# Patient Record
Sex: FEMALE | Race: WHITE | NOT HISPANIC OR LATINO | Employment: UNEMPLOYED | URBAN - METROPOLITAN AREA
[De-identification: names, ages, dates, MRNs, and addresses within clinical notes are randomized per-mention and may not be internally consistent; named-entity substitution may affect disease eponyms.]

---

## 2023-05-05 RX ORDER — FLUOXETINE HYDROCHLORIDE 20 MG/1
20 CAPSULE ORAL DAILY
COMMUNITY
End: 2024-03-27

## 2023-05-05 RX ORDER — OMEPRAZOLE 20 MG/1
20 CAPSULE, DELAYED RELEASE ORAL DAILY
COMMUNITY

## 2023-05-05 RX ORDER — LAMOTRIGINE 200 MG/1
200 TABLET ORAL DAILY
COMMUNITY

## 2023-05-08 ENCOUNTER — ANESTHESIA (OUTPATIENT)
Dept: ENDOSCOPY | Facility: HOSPITAL | Age: 43
End: 2023-05-08
Payer: MEDICAID

## 2023-05-08 ENCOUNTER — ANESTHESIA EVENT (OUTPATIENT)
Dept: ENDOSCOPY | Facility: HOSPITAL | Age: 43
End: 2023-05-08
Payer: MEDICAID

## 2023-05-08 ENCOUNTER — HOSPITAL ENCOUNTER (OUTPATIENT)
Facility: HOSPITAL | Age: 43
Discharge: HOME | End: 2023-05-08
Attending: SURGERY | Admitting: SURGERY
Payer: MEDICAID

## 2023-05-08 VITALS
TEMPERATURE: 97 F | OXYGEN SATURATION: 97 % | SYSTOLIC BLOOD PRESSURE: 106 MMHG | DIASTOLIC BLOOD PRESSURE: 66 MMHG | RESPIRATION RATE: 16 BRPM | HEART RATE: 65 BPM

## 2023-05-08 DIAGNOSIS — K29.00 ACUTE SUPERFICIAL GASTRITIS WITHOUT HEMORRHAGE: Primary | ICD-10-CM

## 2023-05-08 DIAGNOSIS — K20.90 ESOPHAGITIS: ICD-10-CM

## 2023-05-08 LAB — B-HCG UR QL: NEGATIVE

## 2023-05-08 PROCEDURE — 43239 EGD BIOPSY SINGLE/MULTIPLE: CPT | Performed by: SURGERY

## 2023-05-08 PROCEDURE — 88305 TISSUE EXAM BY PATHOLOGIST: ICD-10-PCS | Mod: 26,,, | Performed by: PATHOLOGY

## 2023-05-08 PROCEDURE — 00731 ANES UPR GI NDSC PX NOS: CPT | Mod: QZ | Performed by: NURSE ANESTHETIST, CERTIFIED REGISTERED

## 2023-05-08 PROCEDURE — 81025 URINE PREGNANCY TEST: CPT | Performed by: SURGERY

## 2023-05-08 PROCEDURE — 88305 TISSUE EXAM BY PATHOLOGIST: CPT | Performed by: PATHOLOGY

## 2023-05-08 PROCEDURE — 27201012 HC FORCEPS, HOT/COLD, DISP: Performed by: SURGERY

## 2023-05-08 PROCEDURE — 25000003 PHARM REV CODE 250: Performed by: NURSE ANESTHETIST, CERTIFIED REGISTERED

## 2023-05-08 PROCEDURE — D9220AH HC ANESTHESIA PROFESSIONAL FEE: Mod: QZ | Performed by: NURSE ANESTHETIST, CERTIFIED REGISTERED

## 2023-05-08 PROCEDURE — 37000008 HC ANESTHESIA 1ST 15 MINUTES: Performed by: SURGERY

## 2023-05-08 PROCEDURE — 00731 ANES UPR GI NDSC PX NOS: CPT | Performed by: SURGERY

## 2023-05-08 PROCEDURE — 88305 TISSUE EXAM BY PATHOLOGIST: CPT | Mod: 26,,, | Performed by: PATHOLOGY

## 2023-05-08 PROCEDURE — 63600175 PHARM REV CODE 636 W HCPCS: Performed by: NURSE ANESTHETIST, CERTIFIED REGISTERED

## 2023-05-08 RX ORDER — PROPOFOL 10 MG/ML
VIAL (ML) INTRAVENOUS
Status: DISCONTINUED | OUTPATIENT
Start: 2023-05-08 | End: 2023-05-08

## 2023-05-08 RX ORDER — LIDOCAINE HYDROCHLORIDE 20 MG/ML
INJECTION, SOLUTION EPIDURAL; INFILTRATION; INTRACAUDAL; PERINEURAL
Status: DISCONTINUED | OUTPATIENT
Start: 2023-05-08 | End: 2023-05-08

## 2023-05-08 RX ADMIN — LIDOCAINE HYDROCHLORIDE 60 MG: 20 INJECTION, SOLUTION EPIDURAL; INFILTRATION; INTRACAUDAL; PERINEURAL at 07:05

## 2023-05-08 RX ADMIN — PROPOFOL 150 MG: 10 INJECTION, EMULSION INTRAVENOUS at 07:05

## 2023-05-08 NOTE — ANESTHESIA PREPROCEDURE EVALUATION
05/08/2023  Hubert Valencia is a 42 y.o., female.      Pre-op Assessment    I have reviewed the Patient Summary Reports.     I have reviewed the Nursing Notes.    I have reviewed the Medications.     Review of Systems  Anesthesia Hx:  No problems with previous Anesthesia    Social:  Non-Smoker, No Alcohol Use    Hematology/Oncology:  Hematology Normal   Oncology Normal     EENT/Dental:EENT/Dental Normal   Cardiovascular:  Cardiovascular Normal Exercise tolerance: good     Pulmonary:  Pulmonary Normal    Renal/:  Renal/ Normal     Hepatic/GI:   GERD, poorly controlled    Musculoskeletal:  Musculoskeletal Normal    Neurological:  Neurology Normal    Endocrine:  Endocrine Normal    Dermatological:  Skin Normal    Psych:  Psychiatric Normal           Physical Exam  General: Well nourished    Airway:  Mallampati: II / II  Mouth Opening: Normal        Anesthesia Plan  Type of Anesthesia, risks & benefits discussed:    Anesthesia Type: Gen Natural Airway  Intra-op Monitoring Plan: Standard ASA Monitors  Post Op Pain Control Plan: multimodal analgesia  Induction:  IV  Airway Plan: Direct, Post-Induction  Informed Consent: Informed consent signed with the Patient and all parties understand the risks and agree with anesthesia plan.  All questions answered. Patient consented to blood products? Yes  ASA Score: 2  Day of Surgery Review of History & Physical: H&P Update referred to the surgeon/provider.I have interviewed and examined the patient. I have reviewed the patient's H&P dated: 5/8/23. There are no significant changes.     Ready For Surgery From Anesthesia Perspective.     .

## 2023-05-08 NOTE — DISCHARGE SUMMARY
[facility]  General Surgery  Discharge Summary      Patient Name: Hubert Valencia  MRN: [id number]  Admission Date: 5/8/2023  Hospital Length of Stay: 0 days  Discharge Date and Time:  05/08/2023 7:59 AM  Attending Physician: Sami Tapia MD   Discharging Provider: Sami Tapia MD  Primary Care Provider: Sabina Ceja MD    HPI:   No notes on file    Procedure(s) (LRB):  EGD (ESOPHAGOGASTRODUODENOSCOPY) (N/A)      Indwelling Lines/Drains at time of discharge:   Lines/Drains/Airways     None               Hospital Course: 5/8/2023: Patient has some distal esophagitis and gastritis. Will continue therapy and follow up EGD in 8 weeks.       Goals of Care Treatment Preferences:             Pending Diagnostic Studies:     Procedure Component Value Units Date/Time    Specimen to Pathology, Surgery Other [[id number]] Collected: 05/08/23 0746    Order Status: Sent Lab Status: In process Updated: 05/08/23 0746        Final Active Diagnoses:    Diagnosis Date Noted POA    PRINCIPAL PROBLEM:  Acute superficial gastritis without hemorrhage [K29.00] 05/08/2023 Yes      Problems Resolved During this Admission:      Discharged Condition: good    Disposition: Home or Self Care    Follow Up:   Follow-up Information     Sami Tapia MD Follow up in 4 week(s).    Specialties: General Surgery, Wound Care  Why: Re-check  Contact information:  [address]  [facility] [address] [phone]                       Patient Instructions:      Diet general     Medications:  Reconciled Home Medications:      Medication List      CONTINUE taking these medications    FLUoxetine 20 MG capsule  Take 20 mg by mouth once daily.     lamoTRIgine 100 MG tablet  Commonly known as: LAMICTAL  Take 100 mg by mouth once daily.     omeprazole 20 MG capsule  Commonly known as: PRILOSEC  Take 20 mg by mouth once daily.          Time spent on the discharge of patient: 20 minutes    Sami Tapia MD  General Surgery  [facility]  Endoscopy

## 2023-05-08 NOTE — TRANSFER OF CARE
Anesthesia Transfer of Care Note    Patient: Hubert Valencia    Procedure(s) Performed: Procedure(s) (LRB):  EGD (ESOPHAGOGASTRODUODENOSCOPY) (N/A)    Patient location: PACU    Anesthesia Type: general    Transport from OR: Transported from OR on 6-10 L/min O2 by face mask with adequate spontaneous ventilation    Post pain: adequate analgesia    Post assessment: no apparent anesthetic complications and tolerated procedure well    Post vital signs: stable    Level of consciousness: sedated    Nausea/Vomiting: no nausea/vomiting    Complications: none    Transfer of care protocol was followed      Last vitals:   Visit Vitals  /64   Pulse 76   Temp 36.2 °C (97.1 °F) (Skin)   Resp 18   SpO2 97%   Breastfeeding No

## 2023-05-08 NOTE — ANESTHESIA POSTPROCEDURE EVALUATION
Anesthesia Post Evaluation    Patient: Hubert Valencia    Procedure(s) Performed: Procedure(s) (LRB):  EGD (ESOPHAGOGASTRODUODENOSCOPY) (N/A)    Final Anesthesia Type: general      Patient location during evaluation: PACU  Patient participation: Yes- Able to Participate  Level of consciousness: awake and alert, oriented and awake  Post-procedure vital signs: reviewed and stable  Pain management: adequate  Airway patency: patent    PONV status at discharge: No PONV  Anesthetic complications: no      Cardiovascular status: blood pressure returned to baseline  Respiratory status: unassisted, spontaneous ventilation and room air  Hydration status: euvolemic  Follow-up not needed.  Comments: [facility]          Vitals Value Taken Time   /64 05/08/23 0749   Temp  05/08/23 0750   Pulse 76 05/08/23 0749   Resp 18 05/08/23 0749   SpO2 97 % 05/08/23 0749         No case tracking events are documented in the log.      Pain/Jennyfer Score: No data recorded

## 2023-05-08 NOTE — HOSPITAL COURSE
5/8/2023: Patient has some distal esophagitis and gastritis. Will continue therapy and follow up EGD in 8 weeks.

## 2023-05-09 NOTE — BRIEF OP NOTE
DATE OF PROCEDURE: 5/8/2023     PREOPERATIVE DIAGNOSES:   Acute superficial gastritis without hemorrhage [K29.00]    POSTOPERATIVE DIAGNOSES:   Post-Op Diagnosis Codes:     * Acute superficial gastritis without hemorrhage [K29.00]    PROCEDURES PERFORMED:   Procedure(s) (LRB):  EGD (ESOPHAGOGASTRODUODENOSCOPY) (N/A)    Surgeon(s) and Role:     * Sami Tapia MD - Primary    ANESTHESIA: mac    ESTIMATED BLOOD LOSS: NONE    SPECIMEN:   Specimen (24h ago, onward)      None            CLINICAL HISTORY AND INDICATION FOR SURGERY: Hubert Valencia is a 42 y.o. female  with reflux and possible esophagitis.     PROCEDURE IN DETAIL: The patient was explained the risks and benefits of the procedure. They agreed to the procedure. They were taken to the operating room on 5/8/2023. Patient was explained the risks and benefits of the procedure and she was given IV sedation.  The scope was then advanced down from the mouth into the duodenum.  The duodenum was normal in appearance.  The patient had a biopsy taken of her antrum which was mildly inflamed, this was done for H pylori.  The scope was retroflexed.  There was no evidence of any other ulcers of the stomach.  There was mild gastritis.  Scope was then withdrawn into the esophagus, there was a small hiatal hernia.  The end of the esophagus had linear inflammation in the form of grade a esophagitis.  There was no evidence of stricture, no evidence of any other esophageal pathology.  Scope was fully withdrawn, patient tolerated the procedure well. This dictation was done with dictating software and there maybe areas contained within it.

## 2023-05-09 NOTE — BRIEF OP NOTE
[facility] - Endoscopy  Brief Operative Note    Surgery Date: 5/8/2023     Surgeon(s) and Role:     * Sami Tapia MD - Primary    Assisting Surgeon: None    Pre-op Diagnosis:  Acute superficial gastritis without hemorrhage [K29.00]    Post-op Diagnosis:  Post-Op Diagnosis Codes:     * Acute superficial gastritis without hemorrhage [K29.00]    Procedure(s) (LRB):  EGD (ESOPHAGOGASTRODUODENOSCOPY) (N/A)    Anesthesia: General    Operative Findings:  Patient was explained the risks and benefits of the procedure and she was given IV sedation.  The scope was then advanced down from the mouth into the duodenum.  The duodenum was normal in appearance.  The patient had a biopsy taken of her antrum which was mildly inflamed, this was done for H pylori.  The scope was retroflexed.  There was no evidence of any other ulcers of the stomach.  There was mild gastritis.  Scope was then withdrawn into the esophagus, there was a small hiatal hernia.  The end of the esophagus had linear inflammation in the form of grade a esophagitis.  There was no evidence of stricture, no evidence of any other esophageal pathology.  Scope was fully withdrawn, patient tolerated the procedure well.    Estimated Blood Loss: * No values recorded between 5/8/2023  7:25 AM and 5/8/2023  7:46 AM *         Specimens:   Specimen (24h ago, onward)      None              Discharge Note    OUTCOME: Patient tolerated treatment/procedure well without complication and is now ready for discharge.    DISPOSITION: Home or Self Care    FINAL DIAGNOSIS:  Acute superficial gastritis without hemorrhage    FOLLOWUP: In clinic    DISCHARGE INSTRUCTIONS:    Discharge Procedure Orders   Diet general

## 2023-05-10 LAB
FINAL PATHOLOGIC DIAGNOSIS: NORMAL
GROSS: NORMAL
Lab: NORMAL

## 2023-06-16 ENCOUNTER — HOSPITAL ENCOUNTER (EMERGENCY)
Facility: HOSPITAL | Age: 43
Discharge: HOME | End: 2023-06-16
Attending: STUDENT IN AN ORGANIZED HEALTH CARE EDUCATION/TRAINING PROGRAM
Payer: MEDICAID

## 2023-06-16 VITALS
HEART RATE: 72 BPM | BODY MASS INDEX: 34.62 KG/M2 | OXYGEN SATURATION: 96 % | HEIGHT: 64 IN | RESPIRATION RATE: 12 BRPM | SYSTOLIC BLOOD PRESSURE: 111 MMHG | DIASTOLIC BLOOD PRESSURE: 65 MMHG | WEIGHT: 202.81 LBS | TEMPERATURE: 99 F

## 2023-06-16 DIAGNOSIS — K92.1 BLACK STOOLS: Primary | ICD-10-CM

## 2023-06-16 LAB
ALBUMIN SERPL BCP-MCNC: 3.7 G/DL (ref 3.5–5.2)
ALP SERPL-CCNC: 73 U/L (ref 55–135)
ALT SERPL W/O P-5'-P-CCNC: 17 U/L (ref 10–44)
ANION GAP SERPL CALC-SCNC: 10 MMOL/L (ref 8–16)
APTT PPP: 29.7 SEC (ref 21–32)
AST SERPL-CCNC: 16 U/L (ref 10–40)
BASOPHILS # BLD AUTO: 0.04 K/UL (ref 0–0.2)
BASOPHILS NFR BLD: 0.4 % (ref 0–1.9)
BILIRUB SERPL-MCNC: 0.2 MG/DL (ref 0.1–1)
BUN SERPL-MCNC: 12 MG/DL (ref 6–20)
CALCIUM SERPL-MCNC: 8.9 MG/DL (ref 8.7–10.5)
CHLORIDE SERPL-SCNC: 107 MMOL/L (ref 95–110)
CO2 SERPL-SCNC: 20 MMOL/L (ref 23–29)
CREAT SERPL-MCNC: 0.8 MG/DL (ref 0.5–1.4)
DIFFERENTIAL METHOD: NORMAL
EOSINOPHIL # BLD AUTO: 0 K/UL (ref 0–0.5)
EOSINOPHIL NFR BLD: 0 % (ref 0–8)
ERYTHROCYTE [DISTWIDTH] IN BLOOD BY AUTOMATED COUNT: 13.4 % (ref 11.5–14.5)
EST. GFR  (NO RACE VARIABLE): >60 ML/MIN/1.73 M^2
GLUCOSE SERPL-MCNC: 94 MG/DL (ref 70–110)
HCT VFR BLD AUTO: 40.2 % (ref 37–48.5)
HGB BLD-MCNC: 13.2 G/DL (ref 12–16)
IMM GRANULOCYTES # BLD AUTO: 0.02 K/UL (ref 0–0.04)
IMM GRANULOCYTES NFR BLD AUTO: 0.2 % (ref 0–0.5)
INR PPP: 1 (ref 0.8–1.2)
LIPASE SERPL-CCNC: 58 U/L (ref 4–60)
LYMPHOCYTES # BLD AUTO: 2.7 K/UL (ref 1–4.8)
LYMPHOCYTES NFR BLD: 29 % (ref 18–48)
MCH RBC QN AUTO: 29.7 PG (ref 27–31)
MCHC RBC AUTO-ENTMCNC: 32.8 G/DL (ref 32–36)
MCV RBC AUTO: 90 FL (ref 82–98)
MONOCYTES # BLD AUTO: 0.8 K/UL (ref 0.3–1)
MONOCYTES NFR BLD: 9 % (ref 4–15)
NEUTROPHILS # BLD AUTO: 5.8 K/UL (ref 1.8–7.7)
NEUTROPHILS NFR BLD: 61.4 % (ref 38–73)
NRBC BLD-RTO: 0 /100 WBC
OB PNL STL: NEGATIVE
PLATELET # BLD AUTO: 284 K/UL (ref 150–450)
PMV BLD AUTO: 9.4 FL (ref 9.2–12.9)
POTASSIUM SERPL-SCNC: 4.1 MMOL/L (ref 3.5–5.1)
PROT SERPL-MCNC: 6.9 G/DL (ref 6–8.4)
PROTHROMBIN TIME: 10.5 SEC (ref 9–12.5)
RBC # BLD AUTO: 4.45 M/UL (ref 4–5.4)
SODIUM SERPL-SCNC: 137 MMOL/L (ref 136–145)
TROPONIN I SERPL DL<=0.01 NG/ML-MCNC: <0.006 NG/ML (ref 0–0.03)
WBC # BLD AUTO: 9.38 K/UL (ref 3.9–12.7)

## 2023-06-16 PROCEDURE — 25000003 PHARM REV CODE 250: Performed by: STUDENT IN AN ORGANIZED HEALTH CARE EDUCATION/TRAINING PROGRAM

## 2023-06-16 PROCEDURE — 96365 THER/PROPH/DIAG IV INF INIT: CPT

## 2023-06-16 PROCEDURE — 96375 TX/PRO/DX INJ NEW DRUG ADDON: CPT

## 2023-06-16 PROCEDURE — 93010 EKG 12-LEAD: ICD-10-PCS | Mod: ,,, | Performed by: INTERNAL MEDICINE

## 2023-06-16 PROCEDURE — 63600175 PHARM REV CODE 636 W HCPCS: Performed by: STUDENT IN AN ORGANIZED HEALTH CARE EDUCATION/TRAINING PROGRAM

## 2023-06-16 PROCEDURE — 94760 N-INVAS EAR/PLS OXIMETRY 1: CPT

## 2023-06-16 PROCEDURE — 93005 ELECTROCARDIOGRAM TRACING: CPT

## 2023-06-16 PROCEDURE — 83690 ASSAY OF LIPASE: CPT | Performed by: STUDENT IN AN ORGANIZED HEALTH CARE EDUCATION/TRAINING PROGRAM

## 2023-06-16 PROCEDURE — 85610 PROTHROMBIN TIME: CPT | Performed by: STUDENT IN AN ORGANIZED HEALTH CARE EDUCATION/TRAINING PROGRAM

## 2023-06-16 PROCEDURE — 36415 COLL VENOUS BLD VENIPUNCTURE: CPT | Performed by: STUDENT IN AN ORGANIZED HEALTH CARE EDUCATION/TRAINING PROGRAM

## 2023-06-16 PROCEDURE — 84484 ASSAY OF TROPONIN QUANT: CPT | Performed by: STUDENT IN AN ORGANIZED HEALTH CARE EDUCATION/TRAINING PROGRAM

## 2023-06-16 PROCEDURE — 82272 OCCULT BLD FECES 1-3 TESTS: CPT | Performed by: STUDENT IN AN ORGANIZED HEALTH CARE EDUCATION/TRAINING PROGRAM

## 2023-06-16 PROCEDURE — 99284 EMERGENCY DEPT VISIT MOD MDM: CPT | Mod: 25

## 2023-06-16 PROCEDURE — C9113 INJ PANTOPRAZOLE SODIUM, VIA: HCPCS | Performed by: STUDENT IN AN ORGANIZED HEALTH CARE EDUCATION/TRAINING PROGRAM

## 2023-06-16 PROCEDURE — 85730 THROMBOPLASTIN TIME PARTIAL: CPT | Performed by: STUDENT IN AN ORGANIZED HEALTH CARE EDUCATION/TRAINING PROGRAM

## 2023-06-16 PROCEDURE — 99900035 HC TECH TIME PER 15 MIN (STAT)

## 2023-06-16 PROCEDURE — 93010 ELECTROCARDIOGRAM REPORT: CPT | Mod: ,,, | Performed by: INTERNAL MEDICINE

## 2023-06-16 PROCEDURE — 85025 COMPLETE CBC W/AUTO DIFF WBC: CPT | Performed by: STUDENT IN AN ORGANIZED HEALTH CARE EDUCATION/TRAINING PROGRAM

## 2023-06-16 PROCEDURE — 80053 COMPREHEN METABOLIC PANEL: CPT | Performed by: STUDENT IN AN ORGANIZED HEALTH CARE EDUCATION/TRAINING PROGRAM

## 2023-06-16 RX ORDER — PANTOPRAZOLE SODIUM 40 MG/10ML
40 INJECTION, POWDER, LYOPHILIZED, FOR SOLUTION INTRAVENOUS
Status: COMPLETED | OUTPATIENT
Start: 2023-06-16 | End: 2023-06-16

## 2023-06-16 RX ORDER — SODIUM CHLORIDE 0.9 % (FLUSH) 0.9 %
10 SYRINGE (ML) INJECTION
Status: DISCONTINUED | OUTPATIENT
Start: 2023-06-16 | End: 2023-06-16 | Stop reason: HOSPADM

## 2023-06-16 RX ADMIN — SODIUM CHLORIDE 8 MG/HR: 900 INJECTION INTRAVENOUS at 10:06

## 2023-06-16 RX ADMIN — PANTOPRAZOLE SODIUM 40 MG: 40 INJECTION, POWDER, FOR SOLUTION INTRAVENOUS at 10:06

## 2023-06-16 NOTE — ED PROVIDER NOTES
Encounter Date: 6/16/2023       History     Chief Complaint   Patient presents with    Melena     42-year-old female with a history of esophagitis and gastritis presents to the emergency department with her  with black stools.  She states she has been dealing with epigastric abdominal pain for the last several days.  Yesterday, she had black stools.  She did take a little bit of Pepto-Bismol several days ago.  She denies any regular NSAID use.  She is compliant with her omeprazole.  She denies any blood thinners.    Dr. Tapia performed an EGD 5/8/23 with diagnosed with superficial gastritis without hemorrhage.      Review of patient's allergies indicates:  No Known Allergies  Past Medical History:   Diagnosis Date    Anxiety disorder, unspecified     Depression      Past Surgical History:   Procedure Laterality Date    ESOPHAGOGASTRODUODENOSCOPY N/A 5/8/2023    Procedure: EGD (ESOPHAGOGASTRODUODENOSCOPY);  Surgeon: Sami Tapia MD;  Location: [facility];  Service: General;  Laterality: N/A;     History reviewed. No pertinent family history.  Social History     Tobacco Use    Smoking status: Every Day     Packs/day: 1.00     Types: Cigarettes    Smokeless tobacco: Never   Substance Use Topics    Alcohol use: Yes     Comment: occ     Review of Systems   Constitutional:  Negative for chills and fever.   HENT:  Negative for congestion, rhinorrhea and sneezing.    Eyes:  Negative for discharge and redness.   Respiratory:  Negative for cough and shortness of breath.    Cardiovascular:  Negative for chest pain and palpitations.   Gastrointestinal:  Positive for abdominal pain. Negative for diarrhea, nausea and vomiting.   Genitourinary:  Negative for dysuria, frequency, vaginal bleeding and vaginal discharge.   Musculoskeletal:  Negative for back pain and neck pain.   Skin:  Negative for rash and wound.   Neurological:  Negative for weakness, numbness and headaches.     Physical Exam     Initial Vitals [06/16/23  0908]   BP Pulse Resp Temp SpO2   130/82 80 12 98.5 °F (36.9 °C) 98 %      MAP       --         Physical Exam    Nursing note and vitals reviewed.  Constitutional: She appears well-developed. She is not diaphoretic. No distress.   HENT:   Head: Normocephalic and atraumatic.   Right Ear: External ear normal.   Left Ear: External ear normal.   Eyes: Right eye exhibits no discharge. Left eye exhibits no discharge. No scleral icterus.   Neck: Neck supple.   Cardiovascular:  Normal rate and regular rhythm.           Pulmonary/Chest: Breath sounds normal. No stridor. No respiratory distress. She has no wheezes. She has no rhonchi. She has no rales.   Abdominal: Abdomen is soft. There is abdominal tenderness in the epigastric area. There is no guarding.   Musculoskeletal:         General: No edema.      Cervical back: Neck supple.     Neurological: She is alert and oriented to person, place, and time.   Skin: Skin is warm and dry. Capillary refill takes less than 2 seconds.   Psychiatric: She has a normal mood and affect.         ED Course   Procedures  Labs Reviewed   COMPREHENSIVE METABOLIC PANEL - Abnormal; Notable for the following components:       Result Value    CO2 20 (*)     All other components within normal limits   CBC W/ AUTO DIFFERENTIAL   PROTIME-INR   OCCULT BLOOD X 1, STOOL   APTT   LIPASE   TROPONIN I     EKG Readings: (Independently Interpreted)   NSR at 82 bpm. Normal axis. Normal intervals. No STEMI. No prior ECG for comparison.     Imaging Results    None          Medications   sodium chloride 0.9% flush 10 mL (has no administration in time range)   pantoprazole injection 40 mg (40 mg Intravenous Given 6/16/23 1022)   pantoprazole (PROTONIX) 40 mg in sodium chloride 0.9 % 100 mL IVPB (MB+) (8 mg/hr Intravenous New Bag 6/16/23 1027)     Medical Decision Making:   Differential Diagnosis:   Ddx: melena, pepto-bismol stool, anemia, shock, peritonitis, colitis  ED Management:  Based on the patient's  evaluation, the patient appears well for discharge home.  She is stable.  No anemia.  Black stools but FOBT negative. Suspect due to Pepto-Bismol use.  Will discharge home to continue PPI and follow up with Dr. Tapia who is planning to repeat an EGD on the patient in a few weeks.  Patient is in agreement.                        Clinical Impression:   Final diagnoses:  [K92.1] Black stools (Primary)        ED Disposition Condition    Discharge Stable          ED Prescriptions    None       Follow-up Information       Follow up With Specialties Details Why Contact Info    Sami Tapia MD General Surgery, Wound Care Call in 5 days  [address] [address]  [phone]               Alan Smith DO  06/16/23 6474

## 2024-03-27 ENCOUNTER — OFFICE VISIT (OUTPATIENT)
Dept: NEUROLOGY | Facility: CLINIC | Age: 44
End: 2024-03-27
Payer: COMMERCIAL

## 2024-03-27 VITALS
RESPIRATION RATE: 16 BRPM | HEART RATE: 80 BPM | SYSTOLIC BLOOD PRESSURE: 122 MMHG | BODY MASS INDEX: 34.91 KG/M2 | WEIGHT: 197.06 LBS | HEIGHT: 63 IN | DIASTOLIC BLOOD PRESSURE: 86 MMHG

## 2024-03-27 DIAGNOSIS — F17.200 NICOTINE DEPENDENCE, UNCOMPLICATED, UNSPECIFIED NICOTINE PRODUCT TYPE: ICD-10-CM

## 2024-03-27 DIAGNOSIS — G25.0 ESSENTIAL TREMOR: Primary | ICD-10-CM

## 2024-03-27 DIAGNOSIS — G21.19 DRUG-INDUCED PARKINSONISM: ICD-10-CM

## 2024-03-27 PROCEDURE — 3008F BODY MASS INDEX DOCD: CPT | Mod: CPTII,S$GLB,, | Performed by: STUDENT IN AN ORGANIZED HEALTH CARE EDUCATION/TRAINING PROGRAM

## 2024-03-27 PROCEDURE — 99205 OFFICE O/P NEW HI 60 MIN: CPT | Mod: S$GLB,,, | Performed by: STUDENT IN AN ORGANIZED HEALTH CARE EDUCATION/TRAINING PROGRAM

## 2024-03-27 PROCEDURE — 3074F SYST BP LT 130 MM HG: CPT | Mod: CPTII,S$GLB,, | Performed by: STUDENT IN AN ORGANIZED HEALTH CARE EDUCATION/TRAINING PROGRAM

## 2024-03-27 PROCEDURE — G2211 COMPLEX E/M VISIT ADD ON: HCPCS | Mod: S$GLB,,, | Performed by: STUDENT IN AN ORGANIZED HEALTH CARE EDUCATION/TRAINING PROGRAM

## 2024-03-27 PROCEDURE — 3079F DIAST BP 80-89 MM HG: CPT | Mod: CPTII,S$GLB,, | Performed by: STUDENT IN AN ORGANIZED HEALTH CARE EDUCATION/TRAINING PROGRAM

## 2024-03-27 PROCEDURE — 99999 PR PBB SHADOW E&M-EST. PATIENT-LVL III: CPT | Mod: PBBFAC,,, | Performed by: STUDENT IN AN ORGANIZED HEALTH CARE EDUCATION/TRAINING PROGRAM

## 2024-03-27 PROCEDURE — 1159F MED LIST DOCD IN RCRD: CPT | Mod: CPTII,S$GLB,, | Performed by: STUDENT IN AN ORGANIZED HEALTH CARE EDUCATION/TRAINING PROGRAM

## 2024-03-27 RX ORDER — ERGOCALCIFEROL 1.25 MG/1
1 CAPSULE ORAL
COMMUNITY
Start: 2024-03-04

## 2024-03-27 RX ORDER — DULOXETIN HYDROCHLORIDE 60 MG/1
60 CAPSULE, DELAYED RELEASE ORAL DAILY
COMMUNITY
Start: 2024-03-08

## 2024-03-27 RX ORDER — PROPRANOLOL HYDROCHLORIDE 60 MG/1
60 CAPSULE, EXTENDED RELEASE ORAL DAILY
COMMUNITY
Start: 2024-01-29 | End: 2024-03-27 | Stop reason: SDUPTHER

## 2024-03-27 RX ORDER — PROPRANOLOL HYDROCHLORIDE 20 MG/1
20 TABLET ORAL 3 TIMES DAILY
Qty: 270 TABLET | Refills: 3 | Status: SHIPPED | OUTPATIENT
Start: 2024-03-27 | End: 2025-03-27

## 2024-03-27 RX ORDER — ARIPIPRAZOLE 5 MG/1
5 TABLET ORAL DAILY
COMMUNITY
Start: 2024-02-29

## 2024-03-27 RX ORDER — DULOXETIN HYDROCHLORIDE 30 MG/1
30 CAPSULE, DELAYED RELEASE ORAL DAILY
COMMUNITY
Start: 2024-03-08

## 2024-03-27 NOTE — PATIENT INSTRUCTIONS
Look into caprylic acid for essential tremor.     ESSENTIAL TREMOR    Your symptoms and examination are most consistent with Essential tremor (ET). This is also commonly referred to as benign essential tremor or familial tremor. The most common symptom is arm tremor that occurs with action. It may also affect the head, voice, and sometimes the legs. Some individuals may have associated mild imbalance and some have some mild cognitive changes. Otherwise, there are almost no other associated symptoms. Tremor progresses slowly over years. Tremor can make it more difficult to eat off utensils, drink without spilling, write legibly, apply make-up, and shave.    There are different ways to treat tremor. A conservative measure is to meet with an occupational therapist to discuss adaptive equipment. Weighted utensils (forks/spoons) and weighted cups may dampen tremor and allow one to eat without spilling. Weighted gloves and weighted pens are also available on [employer]. There is a new anti-tremor spoon called the Liftware spoon. More information and videos can be found at www.[employer]cast.    Oral medications may be helpful. There are many medications but the two first line agents are propranolol (Inderal) and primidone (Mysoline). A 30% improvement in the tremor is typically goal with oral medications.  A brain surgery is also available for ET called deep brain stimulation (DBS). DBS is reserved for patients with disabling tremor that is refractory to medication. Other features are necessary to qualify for the surgery but we will discuss these if/when necessary. Brittnee Trio is also an option however is contraindicated in epilepsy if this applies to you.     You may find more information on ET and treatments at [email or web address].

## 2024-03-27 NOTE — PROGRESS NOTES
Name: Hubert Valencia  MRN: [id number]   CSN: [id number]      Date: 03/27/2024    Referring physician:  Sabina Ceja MD  [address]  [place] [address]    Chief Complaint / Interval History: Tremor    History of Present Illness (HPI):    Ms. Valencia is a 42 yo RH woman who presents for evaluation of tremor.   Tremor started in her early 20s involving both hands. Also in the legs and head more recently. Mostly with use of her hands more so than rest.   Began treatment for bipolar with neuroleptics in her 30s.   No walking issues.   Drinks a moderate amount of caffeine. Drinks 2 cups coffee/day and 1 soft drink. Also smokes.   Drinks ETOH very rarely. Her tremors do get better with ETOH.   Mother also has ET. Her nephew and great niece also have tremor. No PD in the family.   Handwriting is affected.     Current Mvmt Medications:  Lamictal 200mg daily   Abilify 5mg daily   Duloxetine 90mg daily   Propanolol 60mg ER daily -- hasn't been on in a few weeks     Prior Mvmt Medication Trials:  Primidone should be used with caution as it lowers the effectiveness of lamictal     Nonmotor ROS:  Smell/Taste: no issues   Voice/Swallowing: no changes   Gait/Falls: no issues   Dizziness: no issues  Hydration: does well with this   Urinary Issues: none   Constipation: none   Sleep/RBD: no active dreams, sleeps well   Hallucinations/Peripheral Illusions: none   Memory/Cognition/Language: doing ok   Mood: doing ok     DA ROS:  ICD Symptoms:  EDS:  LE Swelling:    Atypical ROS:  Sexual Dysfunction:  Pseudobulbar:  Stridor/SOB:  Visual Change:    Past Medical History: The patient  has a past medical history of Anxiety disorder, unspecified and Depression.    Relevant Surgical History:   Past Surgical History:   Procedure Laterality Date    ESOPHAGOGASTRODUODENOSCOPY N/A 5/8/2023    Procedure: EGD (ESOPHAGOGASTRODUODENOSCOPY);  Surgeon: Sami Tapia MD;  Location: [facility];  Service: General;  Laterality: N/A;  "      Social History: The patient  reports that she has been smoking cigarettes. She has never used smokeless tobacco. She reports current alcohol use.  Currently smokes 1 ppd.     Family History: Their family history is not on file. Mother with ET.     Allergies: Patient has no known allergies.     Meds:   Current Outpatient Medications on File Prior to Visit   Medication Sig Dispense Refill    ARIPiprazole (ABILIFY) 5 MG Tab Take 5 mg by mouth once daily.      DULoxetine (CYMBALTA) 30 MG capsule Take 30 mg by mouth once daily. In addition to 60 mg      DULoxetine (CYMBALTA) 60 MG capsule Take 60 mg by mouth once daily.      ergocalciferol (ERGOCALCIFEROL) 50,000 unit Cap Take 1 capsule by mouth every 7 days.      lamoTRIgine (LAMICTAL) 200 MG tablet Take 200 mg by mouth once daily.      omeprazole (PRILOSEC) 20 MG capsule Take 20 mg by mouth once daily.      [DISCONTINUED] propranoloL (INDERAL LA) 60 MG 24 hr capsule Take 60 mg by mouth once daily.      [DISCONTINUED] FLUoxetine 20 MG capsule Take 20 mg by mouth once daily.       No current facility-administered medications on file prior to visit.       Exam:  /86 (BP Location: Left arm, Patient Position: Sitting, BP Method: Medium (Manual))   Pulse 80   Resp 16   Ht 5' 3" (1.6 m)   Wt 89.4 kg (197 lb 1.5 oz)   BMI 34.91 kg/m²     Constitutional  Well-developed, well-nourished, appears stated age   Cardiovascular  No LE edema bilaterally   Neurological    * Mental status  MOCA = not done during today's visit     - Orientation  Oriented to conversation     - Memory   Intact recent and remote     - Attention/concentration  Attentive, vigilant during exam     - Language  Intact to conversation.     - Fund of knowledge  Aware of current events     - Executive  Well-organized thoughts     - Other     * Cranial nerves       - CN II  Pupils equal, visual fields full to confrontation     - CN III, IV, VI  Extraocular movements full, normal pursuits and saccades "         - CN VII  Face strong and symmetric bilaterally     - CN VIII  Hearing intact bilaterally         - CN XI  SCM and trapezius 5/5 bilaterally       * Motor  Muscle bulk normal, strength 5/5 throughout   * Sensory   Intact to light touch throughout   * Coordination  No dysmetria with finger-to-nose or heel-to-shin   * Gait  See below.   * Deep tendon reflexes  2+ and symmetric throughout     * Specialized movement exam Gen: normal facial expressiosn  Speech: vocal tremor   Tremor: right hand and foot tremor at rest, bilateral postural and action tremor (L>R), no-no head tremor   Bradykinesia: some mild slowing with decrement on the right with finger taps - some mild slowing in bilateral feet with toe taps   Tone: normal   Gait: reduced arm swing bilaterally, slightly stooped, good stride length, no tremor      Medical Record Review:  Labs, imaging and prior notes reviewed independently.     Diagnoses:          1. Essential tremor        2. Drug-induced parkinsonism        3. Nicotine dependence, uncomplicated, unspecified nicotine product type            Assessment:  Ms. Valencia is a 44 yo RH woman with bipolar disorder and what appears to be mixed tremor ET>>DIP (affecting right side). She is most bothered by her ET symptoms which include action and postural tremor in her bilateral hands (L>R) and head tremor. She was previously on Propranolol 60mg ER but has been off of it for a few weeks. Didn't find it overly helpful but also was not having any side effects. She is on Abilify and Lamictal both of which can cause tremor. She has some bradykinesia involving the right arm/leg which is likely neuroleptic driven. Her tremors began BEFORE starting any neuroleptics for Bipolar. We agreed to the following:     Plan:  - For ET, I will resume Propranolol but will dose 20mg TID with plans to increase as tolerated after 2-3 weeks. Discussed common side effects. Hesitant to use primidone due to its affect on lamictal.  Can consider GBP or TPM. Discussed Octanoic Acid supplement.  - For parkinsonism involving her right deandre-body - this is most likely related to her Abilify usage. Would not recommend that she change her regimen if it is working for her. Just continue to monitor. Not currently bothersome. No tardive movements.   - We briefly discussed lesional therapies for ET given her family history.   - We discussed that LTG can also cause tremor however this is rare when not combined it other drugs such as VPA.  - Limit caffeine and nicotine usage for better tremor control.   - Discussed weighted devices. Can consider OT in the future.     The visit today is associated with current or anticipated ongoing medical care related to this patients complex condition. Patient should RTC in 4 months to see me.     Total time: 60 minutes spent on the encounter, which includes face to face time and non-face to face time preparing to see the patient (eg, review of tests), Obtaining and/or reviewing separately obtained history, Documenting clinical information in the electronic or other health record, Independently interpreting results (not separately reported) and communicating results to the patient/family/caregiver, or Care coordination (not separately reported).     Anali Moore MD  Division of Movement and Memory Disorders  [facility]  [phone]

## 2024-07-25 ENCOUNTER — OFFICE VISIT (OUTPATIENT)
Dept: NEUROLOGY | Facility: CLINIC | Age: 44
End: 2024-07-25
Payer: COMMERCIAL

## 2024-07-25 VITALS
SYSTOLIC BLOOD PRESSURE: 106 MMHG | OXYGEN SATURATION: 98 % | HEIGHT: 63 IN | BODY MASS INDEX: 34.06 KG/M2 | DIASTOLIC BLOOD PRESSURE: 74 MMHG | HEART RATE: 84 BPM | WEIGHT: 192.25 LBS

## 2024-07-25 DIAGNOSIS — G21.19 DRUG-INDUCED PARKINSONISM: ICD-10-CM

## 2024-07-25 DIAGNOSIS — F17.200 NICOTINE DEPENDENCE, UNCOMPLICATED, UNSPECIFIED NICOTINE PRODUCT TYPE: ICD-10-CM

## 2024-07-25 DIAGNOSIS — G25.0 ESSENTIAL TREMOR: Primary | ICD-10-CM

## 2024-07-25 PROCEDURE — 99999 PR PBB SHADOW E&M-EST. PATIENT-LVL III: CPT | Mod: PBBFAC,,, | Performed by: STUDENT IN AN ORGANIZED HEALTH CARE EDUCATION/TRAINING PROGRAM

## 2024-07-25 PROCEDURE — G2211 COMPLEX E/M VISIT ADD ON: HCPCS | Mod: S$GLB,,, | Performed by: STUDENT IN AN ORGANIZED HEALTH CARE EDUCATION/TRAINING PROGRAM

## 2024-07-25 PROCEDURE — 3008F BODY MASS INDEX DOCD: CPT | Mod: CPTII,S$GLB,, | Performed by: STUDENT IN AN ORGANIZED HEALTH CARE EDUCATION/TRAINING PROGRAM

## 2024-07-25 PROCEDURE — 3078F DIAST BP <80 MM HG: CPT | Mod: CPTII,S$GLB,, | Performed by: STUDENT IN AN ORGANIZED HEALTH CARE EDUCATION/TRAINING PROGRAM

## 2024-07-25 PROCEDURE — 99214 OFFICE O/P EST MOD 30 MIN: CPT | Mod: S$GLB,,, | Performed by: STUDENT IN AN ORGANIZED HEALTH CARE EDUCATION/TRAINING PROGRAM

## 2024-07-25 PROCEDURE — 3074F SYST BP LT 130 MM HG: CPT | Mod: CPTII,S$GLB,, | Performed by: STUDENT IN AN ORGANIZED HEALTH CARE EDUCATION/TRAINING PROGRAM

## 2024-07-25 PROCEDURE — 1159F MED LIST DOCD IN RCRD: CPT | Mod: CPTII,S$GLB,, | Performed by: STUDENT IN AN ORGANIZED HEALTH CARE EDUCATION/TRAINING PROGRAM

## 2024-07-25 RX ORDER — PROPRANOLOL HYDROCHLORIDE 80 MG/1
80 CAPSULE, EXTENDED RELEASE ORAL DAILY
Qty: 30 CAPSULE | Refills: 11 | Status: SHIPPED | OUTPATIENT
Start: 2024-07-25 | End: 2025-07-25

## 2024-07-25 NOTE — PROGRESS NOTES
Name: Hubert Valencia  MRN: [id number]   CSN: [id number]      Date: 07/25/2024    Referring physician:  No referring provider defined for this encounter.    Chief Complaint / Interval History: Tremor    History of Present Illness (HPI):    Ms. Valencia is a 44 yo RH woman who presents for evaluation of tremor.   Tremor started in her early 20s involving both hands. Also in the legs and head more recently. Mostly with use of her hands more so than rest.   Began treatment for bipolar with neuroleptics in her 30s.   No walking issues.   Drinks a moderate amount of caffeine. Drinks 2 cups coffee/day and 1 soft drink. Also smokes.   Drinks ETOH very rarely. Her tremors do get better with ETOH.   Mother also has ET. Her nephew and great niece also have tremor. No PD in the family.   Handwriting is affected.     Interval History:  Ms. Valencia presents for follow-up.   Currently on Propranolol 20mg TID.  Hasn't yet tried any weighted devices.   No falls. No changes in walking.   Not much coffee but smoking is more of an issue.     Current Mvmt Medications:  Lamictal 200mg daily   Abilify 5mg daily   Duloxetine 90mg daily   Propanolol 60mg ER daily -- hasn't been on in a few weeks     Prior Mvmt Medication Trials:  Primidone should be used with caution as it lowers the effectiveness of lamictal     Nonmotor ROS:  Smell/Taste: no issues   Voice/Swallowing: no changes   Gait/Falls: no issues   Dizziness: no issues  Hydration: does well with this   Urinary Issues: none   Constipation: none   Sleep/RBD: no active dreams, sleeps well   Hallucinations/Peripheral Illusions: none   Memory/Cognition/Language: doing ok   Mood: doing ok     Past Medical History: The patient  has a past medical history of Anxiety disorder, unspecified and Depression.    Relevant Surgical History:   Past Surgical History:   Procedure Laterality Date    ESOPHAGOGASTRODUODENOSCOPY N/A 5/8/2023    Procedure: EGD (ESOPHAGOGASTRODUODENOSCOPY);  Surgeon: Sami Tapia,  "MD;  Location: [facility];  Service: General;  Laterality: N/A;       Social History: The patient  reports that she has been smoking cigarettes. She has never used smokeless tobacco. She reports current alcohol use.  Currently smokes 1 ppd.     Family History: Their family history is not on file. Mother with ET.     Allergies: Patient has no known allergies.     Meds:   Current Outpatient Medications on File Prior to Visit   Medication Sig Dispense Refill    ARIPiprazole (ABILIFY) 5 MG Tab Take 5 mg by mouth once daily.      DULoxetine (CYMBALTA) 30 MG capsule Take 30 mg by mouth once daily. In addition to 60 mg      DULoxetine (CYMBALTA) 60 MG capsule Take 60 mg by mouth once daily.      ergocalciferol (ERGOCALCIFEROL) 50,000 unit Cap Take 1 capsule by mouth every 7 days.      lamoTRIgine (LAMICTAL) 200 MG tablet Take 200 mg by mouth once daily.      omeprazole (PRILOSEC) 20 MG capsule Take 20 mg by mouth once daily.      [DISCONTINUED] propranoloL (INDERAL) 20 MG tablet Take 1 tablet (20 mg total) by mouth 3 (three) times daily. 270 tablet 3     No current facility-administered medications on file prior to visit.       Exam:  /74 (BP Location: Left arm, Patient Position: Sitting, BP Method: Medium (Manual))   Pulse 84   Ht 5' 3" (1.6 m)   Wt 87.2 kg (192 lb 3.9 oz)   SpO2 98%   BMI 34.05 kg/m²     Constitutional  Well-developed, well-nourished, appears stated age   Cardiovascular  No LE edema bilaterally   Neurological    * Mental status  MOCA = not done during today's visit     - Orientation  Oriented to conversation     - Memory   Intact recent and remote     - Attention/concentration  Attentive, vigilant during exam     - Language  Intact to conversation.     - Fund of knowledge  Aware of current events     - Executive  Well-organized thoughts     - Other     * Cranial nerves       - CN II  Pupils equal, visual fields full to confrontation     - CN III, IV, VI  Extraocular movements full, normal " pursuits and saccades         - CN VII  Face strong and symmetric bilaterally     - CN VIII  Hearing intact bilaterally         - CN XI  SCM and trapezius 5/5 bilaterally       * Motor  Muscle bulk normal, strength 5/5 throughout   * Sensory   Intact to light touch throughout   * Coordination  No dysmetria with finger-to-nose or heel-to-shin   * Gait  See below.   * Deep tendon reflexes  2+ and symmetric throughout     * Specialized movement exam Gen: normal facial expressiosn  Speech: vocal tremor   Tremor: right hand and foot tremor at rest - slight left foot rest tremor as well, bilateral postural and action tremor (L>R), no-no head tremor   Bradykinesia: some mild slowing with decrement on the right with finger taps - some mild slowing in bilateral feet with toe taps   Tone: normal   Gait: reduced arm swing bilaterally, slightly stooped, good stride length, no tremor          Medical Record Review:  Labs, imaging and prior notes reviewed independently.     Diagnoses:          1. Essential tremor  propranoloL (INDERAL LA) 80 MG 24 hr capsule      2. Drug-induced parkinsonism        3. Nicotine dependence, uncomplicated, unspecified nicotine product type              Assessment:  Ms. Valencia is a 44 yo RH woman with bipolar disorder and what appears to be mixed tremor ET>>DIP (affecting right side). She is most bothered by her ET symptoms which include action and postural tremor in her bilateral hands (L>R) and head tremor. She was previously on Propranolol 60mg daily but felt it was not very effective. She reports no side effects to propranolol. She is on Abilify and Lamictal both of which can cause tremor. She has some bradykinesia involving the right arm/leg which is likely neuroleptic driven. Her tremors began BEFORE starting any neuroleptics for Bipolar. We agreed to the following:     Plan:  - For ET, I increase her to Propranolol LA 80mg daily. Discussed common side effects.Hesitant to use primidone due to  its affect on lamictal. Can consider GBP or TPM. Discussed Octanoic Acid supplement.  - For parkinsonism involving her right deandre-body - this is most likely related to her Abilify usage. Would not recommend that she change her regimen if it is working for her. Just continue to monitor. Not currently bothersome. No tardive movements.   - We briefly discussed lesional therapies for ET given her family history.   - We discussed that LTG can also cause tremor however this is rare when not combined it other drugs such as VPA.  - Limit caffeine and nicotine usage for better tremor control.   - Discussed weighted devices. Can consider OT in the future.     The visit today is associated with current or anticipated ongoing medical care related to this patients complex condition. Patient should RTC in 4 months to see me.     Anali Moore MD  Division of Movement and Memory Disorders  [facility]  [phone]

## 2024-11-27 ENCOUNTER — OFFICE VISIT (OUTPATIENT)
Dept: NEUROLOGY | Facility: CLINIC | Age: 44
End: 2024-11-27
Payer: MEDICAID

## 2024-11-27 VITALS
HEART RATE: 76 BPM | HEIGHT: 63 IN | WEIGHT: 194.25 LBS | DIASTOLIC BLOOD PRESSURE: 86 MMHG | SYSTOLIC BLOOD PRESSURE: 116 MMHG | RESPIRATION RATE: 16 BRPM | BODY MASS INDEX: 34.42 KG/M2

## 2024-11-27 DIAGNOSIS — G21.19 DRUG-INDUCED PARKINSONISM: ICD-10-CM

## 2024-11-27 DIAGNOSIS — G25.0 ESSENTIAL TREMOR: Primary | ICD-10-CM

## 2024-11-27 PROCEDURE — 99214 OFFICE O/P EST MOD 30 MIN: CPT | Mod: S$PBB,,, | Performed by: STUDENT IN AN ORGANIZED HEALTH CARE EDUCATION/TRAINING PROGRAM

## 2024-11-27 PROCEDURE — 3008F BODY MASS INDEX DOCD: CPT | Mod: CPTII,,, | Performed by: STUDENT IN AN ORGANIZED HEALTH CARE EDUCATION/TRAINING PROGRAM

## 2024-11-27 PROCEDURE — G2211 COMPLEX E/M VISIT ADD ON: HCPCS | Mod: S$PBB,,, | Performed by: STUDENT IN AN ORGANIZED HEALTH CARE EDUCATION/TRAINING PROGRAM

## 2024-11-27 PROCEDURE — 99213 OFFICE O/P EST LOW 20 MIN: CPT | Mod: PBBFAC | Performed by: STUDENT IN AN ORGANIZED HEALTH CARE EDUCATION/TRAINING PROGRAM

## 2024-11-27 PROCEDURE — 3074F SYST BP LT 130 MM HG: CPT | Mod: CPTII,,, | Performed by: STUDENT IN AN ORGANIZED HEALTH CARE EDUCATION/TRAINING PROGRAM

## 2024-11-27 PROCEDURE — 1159F MED LIST DOCD IN RCRD: CPT | Mod: CPTII,,, | Performed by: STUDENT IN AN ORGANIZED HEALTH CARE EDUCATION/TRAINING PROGRAM

## 2024-11-27 PROCEDURE — 3079F DIAST BP 80-89 MM HG: CPT | Mod: CPTII,,, | Performed by: STUDENT IN AN ORGANIZED HEALTH CARE EDUCATION/TRAINING PROGRAM

## 2024-11-27 PROCEDURE — 99999 PR PBB SHADOW E&M-EST. PATIENT-LVL III: CPT | Mod: PBBFAC,,, | Performed by: STUDENT IN AN ORGANIZED HEALTH CARE EDUCATION/TRAINING PROGRAM

## 2024-11-27 RX ORDER — ATORVASTATIN CALCIUM 20 MG/1
20 TABLET, FILM COATED ORAL NIGHTLY
COMMUNITY
Start: 2024-11-07

## 2024-11-27 NOTE — PROGRESS NOTES
Name: Hubert Valencia  MRN: [id number]   CSN: [id number]      Date: 11/27/2024    Chief Complaint / Interval History: Tremor    History of Present Illness (HPI):    Ms. Valencia is a 42 yo RH woman who presents for evaluation of tremor.   Tremor started in her early 20s involving both hands. Also in the legs and head more recently. Mostly with use of her hands more so than rest.   Began treatment for bipolar with neuroleptics in her 30s.   No walking issues.   Drinks a moderate amount of caffeine. Drinks 2 cups coffee/day and 1 soft drink. Also smokes.   Drinks ETOH very rarely. Her tremors do get better with ETOH.   Mother also has ET. Her nephew and great niece also have tremor. No PD in the family.   Handwriting is affected.     Interval History:  Ms. Valencia presents for follow-up.   She is tolerating Propranolol LA 80mg without issue. She missed a weekend and noticed worsening in her tremor. No falls. No changing in her walking. Limiting caffeine has been difficult.   Hand and head shaking have worsened slightly per .     Current Mvmt Medications:  Lamictal 200mg daily   Abilify 5mg daily   Duloxetine 90mg daily   Propanolol 80mg ER daily   Prilosec     Prior Mvmt Medication Trials:  Primidone should be used with caution as it lowers the effectiveness of lamictal     Nonmotor ROS:  Smell/Taste: no issues   Voice/Swallowing: no changes   Gait/Falls: no issues   Dizziness: no issues  Hydration: does well with this   Urinary Issues: none   Constipation: none   Sleep/RBD: no active dreams, sleeps well   Hallucinations/Peripheral Illusions: none   Memory/Cognition/Language: doing ok   Mood: doing ok     Past Medical History: The patient  has a past medical history of Anxiety disorder, unspecified and Depression.    Relevant Surgical History:   Past Surgical History:   Procedure Laterality Date    ESOPHAGOGASTRODUODENOSCOPY N/A 5/8/2023    Procedure: EGD (ESOPHAGOGASTRODUODENOSCOPY);  Surgeon: Sami Tapia MD;   "Location: [facility];  Service: General;  Laterality: N/A;       Social History: The patient  reports that she has been smoking cigarettes. She has never used smokeless tobacco. She reports current alcohol use.  Currently smokes 1 ppd.     Family History: Their family history is not on file. Mother with ET.     Allergies: Patient has no known allergies.     Meds:   Current Outpatient Medications on File Prior to Visit   Medication Sig Dispense Refill    ARIPiprazole (ABILIFY) 5 MG Tab Take 5 mg by mouth once daily.      atorvastatin (LIPITOR) 20 MG tablet Take 20 mg by mouth every evening.      DULoxetine (CYMBALTA) 30 MG capsule Take 30 mg by mouth once daily. In addition to 60 mg      DULoxetine (CYMBALTA) 60 MG capsule Take 60 mg by mouth once daily.      lamoTRIgine (LAMICTAL) 200 MG tablet Take 200 mg by mouth once daily.      omeprazole (PRILOSEC) 20 MG capsule Take 20 mg by mouth once daily.      propranoloL (INDERAL LA) 80 MG 24 hr capsule Take 1 capsule (80 mg total) by mouth once daily. 30 capsule 11    [DISCONTINUED] ergocalciferol (ERGOCALCIFEROL) 50,000 unit Cap Take 1 capsule by mouth every 7 days.       No current facility-administered medications on file prior to visit.       Exam:  /86 (BP Location: Left arm, Patient Position: Sitting)   Pulse 76   Resp 16   Ht 5' 3" (1.6 m)   Wt 88.1 kg (194 lb 3.6 oz)   BMI 34.41 kg/m²     Constitutional  Well-developed, well-nourished, appears stated age   Cardiovascular  No LE edema bilaterally   Neurological    * Mental status  MOCA = not done during today's visit     - Orientation  Oriented to conversation     - Memory   Intact recent and remote     - Attention/concentration  Attentive, vigilant during exam     - Language  Intact to conversation.     - Fund of knowledge  Aware of current events     - Executive  Well-organized thoughts     - Other     * Cranial nerves       - CN II  Pupils equal, visual fields full to confrontation     - CN III, IV, " VI  Extraocular movements full, normal pursuits and saccades         - CN VII  Face strong and symmetric bilaterally     - CN VIII  Hearing intact bilaterally         - CN XI  SCM and trapezius 5/5 bilaterally       * Motor  Muscle bulk normal, strength 5/5 throughout   * Sensory   Intact to light touch throughout   * Coordination  No dysmetria with finger-to-nose or heel-to-shin   * Gait  See below.   * Deep tendon reflexes  2+ and symmetric throughout     * Specialized movement exam Gen: normal facial expressiosn  Speech: vocal tremor   Tremor: right hand and foot tremor at rest - slight left foot rest tremor as well, bilateral postural and action tremor (L>R), no-no head tremor   Bradykinesia: some mild slowing with decrement on the right with finger taps - some mild slowing in bilateral feet with toe taps   Tone: normal   Gait: reduced arm swing bilaterally, slightly stooped, good stride length, no tremor            Medical Record Review:  Labs, imaging and prior notes reviewed independently.     Diagnoses:          No diagnosis found.        Assessment:  Ms. Valencia is a 45 yo RH woman with bipolar disorder and what appears to be mixed tremor ET>>DIP (affecting right side). She is most bothered by her ET symptoms which include action and postural tremor in her bilateral hands (L>R) and head tremor. She was previously on Propranolol 60mg daily but felt it was not very effective. She reports no side effects to propranolol. She is on Abilify and Lamictal both of which can cause tremor. She has some bradykinesia involving the right arm/leg which is likely neuroleptic driven. Her tremors began BEFORE starting any neuroleptics for Bipolar. We agreed to the following:     Plan:    - For ET, continue Propranolol LA 80mg daily. Discussed common side effects.Hesitant to use primidone due to its affect on lamictal. Can consider GBP or TPM. May also be able to push propranolol dose in the future as well. Discussed Octanoic  Acid supplement.  - For parkinsonism involving her right deandre-body - this is most likely related to her Abilify usage. Would not recommend that she change her regimen if it is working for her. Just continue to monitor. Not currently bothersome. No tardive movements.   - We briefly discussed lesional therapies for ET given her family history. She will consider.   - We discussed that LTG can also cause tremor however this is rare when not combined it other drugs such as VPA.  - Limit caffeine and nicotine usage for better tremor control.   - Discussed weighted devices. Can consider OT in the future.     The visit today is associated with current or anticipated ongoing medical care related to this patients complex condition. Patient should RTC in 4 months to see me.     Anali Moore MD  Division of Movement and Memory Disorders  [facility]  [phone]

## 2025-06-21 ENCOUNTER — OFFICE VISIT (OUTPATIENT)
Dept: NEUROLOGY | Facility: CLINIC | Age: 45
End: 2025-06-21
Payer: MEDICAID

## 2025-06-21 VITALS
SYSTOLIC BLOOD PRESSURE: 112 MMHG | HEART RATE: 64 BPM | BODY MASS INDEX: 35.62 KG/M2 | DIASTOLIC BLOOD PRESSURE: 84 MMHG | RESPIRATION RATE: 14 BRPM | HEIGHT: 63 IN | WEIGHT: 201.06 LBS | OXYGEN SATURATION: 98 %

## 2025-06-21 DIAGNOSIS — G25.0 ESSENTIAL TREMOR: Primary | ICD-10-CM

## 2025-06-21 DIAGNOSIS — G21.19 DRUG-INDUCED PARKINSONISM: ICD-10-CM

## 2025-06-21 DIAGNOSIS — F17.200 NICOTINE DEPENDENCE, UNCOMPLICATED, UNSPECIFIED NICOTINE PRODUCT TYPE: ICD-10-CM

## 2025-06-21 PROCEDURE — 99999 PR PBB SHADOW E&M-EST. PATIENT-LVL III: CPT | Mod: PBBFAC,,, | Performed by: STUDENT IN AN ORGANIZED HEALTH CARE EDUCATION/TRAINING PROGRAM

## 2025-06-21 PROCEDURE — 1159F MED LIST DOCD IN RCRD: CPT | Mod: CPTII,,, | Performed by: STUDENT IN AN ORGANIZED HEALTH CARE EDUCATION/TRAINING PROGRAM

## 2025-06-21 PROCEDURE — 99214 OFFICE O/P EST MOD 30 MIN: CPT | Mod: S$PBB,,, | Performed by: STUDENT IN AN ORGANIZED HEALTH CARE EDUCATION/TRAINING PROGRAM

## 2025-06-21 PROCEDURE — 3008F BODY MASS INDEX DOCD: CPT | Mod: CPTII,,, | Performed by: STUDENT IN AN ORGANIZED HEALTH CARE EDUCATION/TRAINING PROGRAM

## 2025-06-21 PROCEDURE — G2211 COMPLEX E/M VISIT ADD ON: HCPCS | Mod: ,,, | Performed by: STUDENT IN AN ORGANIZED HEALTH CARE EDUCATION/TRAINING PROGRAM

## 2025-06-21 PROCEDURE — 3079F DIAST BP 80-89 MM HG: CPT | Mod: CPTII,,, | Performed by: STUDENT IN AN ORGANIZED HEALTH CARE EDUCATION/TRAINING PROGRAM

## 2025-06-21 PROCEDURE — 3074F SYST BP LT 130 MM HG: CPT | Mod: CPTII,,, | Performed by: STUDENT IN AN ORGANIZED HEALTH CARE EDUCATION/TRAINING PROGRAM

## 2025-06-21 PROCEDURE — 99213 OFFICE O/P EST LOW 20 MIN: CPT | Mod: PBBFAC | Performed by: STUDENT IN AN ORGANIZED HEALTH CARE EDUCATION/TRAINING PROGRAM

## 2025-06-21 RX ORDER — TOPIRAMATE 25 MG/1
TABLET, FILM COATED ORAL
Qty: 180 TABLET | Refills: 3 | Status: SHIPPED | OUTPATIENT
Start: 2025-06-21

## 2025-06-21 NOTE — PROGRESS NOTES
Name: Hubert Valencia  MRN: [id number]   CSN: [id number]      Date: 06/21/2025    Chief Complaint / Interval History: Tremor    History of Present Illness (HPI):    Ms. Valencia is a 44 yo RH woman who presents for evaluation of tremor.   Tremor started in her early 20s involving both hands. Also in the legs and head more recently. Mostly with use of her hands more so than rest.   Began treatment for bipolar with neuroleptics in her 30s.   No walking issues.   Drinks a moderate amount of caffeine. Drinks 2 cups coffee/day and 1 soft drink. Also smokes.   Drinks ETOH very rarely. Her tremors do get better with ETOH.   Mother also has ET. Her nephew and great niece also have tremor. No PD in the family.   Handwriting is affected.     Interval History:  Ms. Valencia presents for follow-up.   Tremors are stable to slightly worsened per .   Still not sure about lesional therapies at this time. May be more interested in FUS in the future.  No falls. Walking is stable.   Started exercising more frequently.    Current Mvmt Medications:  Lamictal 200mg daily   Abilify 5mg daily   Duloxetine 90mg daily   Propanolol 80mg ER daily   Prilosec     Prior Mvmt Medication Trials:  Primidone should be used with caution as it lowers the effectiveness of lamictal     Nonmotor ROS:  Smell/Taste: no issues   Voice/Swallowing: no changes   Gait/Falls: no issues   Dizziness: no issues  Hydration: does well with this   Urinary Issues: none   Constipation: none   Sleep/RBD: no active dreams, sleeps well   Hallucinations/Peripheral Illusions: none   Memory/Cognition/Language: doing ok   Mood: doing ok     Past Medical History: The patient  has a past medical history of Anxiety disorder, unspecified and Depression.    Relevant Surgical History:   Past Surgical History:   Procedure Laterality Date    ESOPHAGOGASTRODUODENOSCOPY N/A 5/8/2023    Procedure: EGD (ESOPHAGOGASTRODUODENOSCOPY);  Surgeon: Sami Tapia MD;  Location: [facility];  Service:  "General;  Laterality: N/A;       Social History: The patient  reports that she has been smoking cigarettes. She has never used smokeless tobacco. She reports current alcohol use.  Currently smokes 1 ppd.     Family History: Their family history includes Breast cancer in her mother. Mother with ET.     Allergies: Patient has no known allergies.     Meds:   Current Outpatient Medications on File Prior to Visit   Medication Sig Dispense Refill    ARIPiprazole (ABILIFY) 5 MG Tab Take 5 mg by mouth once daily.      atorvastatin (LIPITOR) 20 MG tablet Take 20 mg by mouth every evening.      DULoxetine (CYMBALTA) 30 MG capsule Take 30 mg by mouth once daily. In addition to 60 mg      DULoxetine (CYMBALTA) 60 MG capsule Take 60 mg by mouth once daily.      lamoTRIgine (LAMICTAL) 200 MG tablet Take 200 mg by mouth once daily.      omeprazole (PRILOSEC) 20 MG capsule Take 20 mg by mouth once daily.      propranoloL (INDERAL LA) 80 MG 24 hr capsule Take 1 capsule (80 mg total) by mouth once daily. 30 capsule 11     No current facility-administered medications on file prior to visit.       Exam:  /84 (BP Location: Left arm, Patient Position: Sitting)   Pulse 64   Resp 14   Ht 5' 3" (1.6 m)   Wt 91.2 kg (201 lb 1 oz)   SpO2 98%   BMI 35.62 kg/m²     Constitutional  Well-developed, well-nourished, appears stated age   Cardiovascular  No LE edema bilaterally   Neurological    * Mental status  MOCA = not done during today's visit     - Orientation  Oriented to conversation     - Memory   Intact recent and remote     - Attention/concentration  Attentive, vigilant during exam     - Language  Intact to conversation.     - Fund of knowledge  Aware of current events     - Executive  Well-organized thoughts     - Other     * Cranial nerves       - CN II  Pupils equal, visual fields full to confrontation     - CN III, IV, VI  Extraocular movements full, normal pursuits and saccades         - CN VII  Face strong and symmetric " bilaterally     - CN VIII  Hearing intact bilaterally         - CN XI  SCM and trapezius 5/5 bilaterally       * Motor  Muscle bulk normal, strength 5/5 throughout   * Sensory   Intact to light touch throughout   * Coordination  No dysmetria with finger-to-nose or heel-to-shin   * Gait  See below.   * Deep tendon reflexes  2+ and symmetric throughout     * Specialized movement exam Gen: normal facial expressiosn  Speech: vocal tremor   Tremor: right hand and foot tremor at rest - slight left foot rest tremor as well, bilateral postural and action tremor (L>R), no-no head tremor   Bradykinesia: some mild slowing with decrement on the right with finger taps - some mild slowing in bilateral feet with toe taps   Tone: normal   Gait: reduced arm swing bilaterally, slightly stooped, good stride length, no tremor - left hand posture a bit            Medical Record Review:  Labs, imaging and prior notes reviewed independently.     Diagnoses:          No diagnosis found.        Assessment:  Ms. Valencia is a 43 yo RH woman with bipolar disorder and what appears to be mixed tremor ET>>DIP (affecting right side). She is most bothered by her ET symptoms which include action and postural tremor in her bilateral hands (L>R) and head tremor. She was previously on Propranolol 60mg daily but felt it was not very effective. She reports no side effects to propranolol. She is on Abilify and Lamictal both of which can cause tremor. She has some bradykinesia involving the right arm/leg which is likely neuroleptic driven. Her tremors began BEFORE starting any neuroleptics for Bipolar. We agreed to the following:     Plan:  - For ET, continue Propranolol LA 80mg daily. Discussed common side effects.Hesitant to use primidone due to its affect on lamictal. We will try TPM 25mg BID. Can consider GBP in the future. May also be able to push propranolol dose in the future as well. Discussed Octanoic Acid supplement.  - For parkinsonism involving  her right deandre-body - this is most likely related to her Abilify usage. Would not recommend that she change her regimen if it is working for her. Just continue to monitor. Not currently bothersome. No tardive movements.   - We briefly discussed lesional therapies for ET given her family history. She will consider - more interested in FUS.   - We discussed that LTG can also cause tremor however this is rare when not combined it other drugs such as VPA.  - Limit caffeine and nicotine usage for better tremor control.   - Discussed weighted devices. Can consider OT in the future.         The visit today is associated with current or anticipated ongoing medical care related to this patients complex condition. Patient should RTC in 4 months to see me.     Anali Moore MD  Division of Movement and Memory Disorders  [facility]  [phone]             [Follow-up Visit ___] : a follow-up visit  for [unfilled]

## 2025-07-26 DIAGNOSIS — G25.0 ESSENTIAL TREMOR: ICD-10-CM

## 2025-07-28 RX ORDER — PROPRANOLOL HYDROCHLORIDE 80 MG/1
80 CAPSULE, EXTENDED RELEASE ORAL
Qty: 30 CAPSULE | Refills: 8 | Status: SHIPPED | OUTPATIENT
Start: 2025-07-28